# Patient Record
Sex: MALE | Race: ASIAN | URBAN - METROPOLITAN AREA
[De-identification: names, ages, dates, MRNs, and addresses within clinical notes are randomized per-mention and may not be internally consistent; named-entity substitution may affect disease eponyms.]

---

## 2018-02-26 ENCOUNTER — IMPORTED ENCOUNTER (OUTPATIENT)
Dept: URBAN - METROPOLITAN AREA CLINIC 1 | Facility: CLINIC | Age: 58
End: 2018-02-26

## 2018-02-26 PROBLEM — H43.812: Noted: 2018-02-26

## 2018-02-26 PROBLEM — E11.9: Noted: 2018-02-26

## 2018-02-26 PROBLEM — H10.45: Noted: 2018-02-26

## 2018-02-26 PROBLEM — H25.13: Noted: 2018-02-26

## 2018-02-26 PROBLEM — Z79.84: Noted: 2018-02-26

## 2018-02-26 PROCEDURE — 92004 COMPRE OPH EXAM NEW PT 1/>: CPT

## 2018-02-26 PROCEDURE — 92015 DETERMINE REFRACTIVE STATE: CPT

## 2018-02-26 NOTE — PATIENT DISCUSSION
1.  DM Type II (Oral Medication) without sign of diabetic retinopathy and no blot heme on dilated retinal examination today OU No Macular Edema:  Discussed the pathophysiology of diabetes and its effect on the eye and risk of blindness. Stressed the importance of strong glucose control. Advised of importance of at least yearly dilated examinations but to contact us immediately for any problems or concerns. 2. Allergic Conjunctivitis OU :  Condition discussed with patient. Advised patient to use OTC Zaditor one drop OU BID prn.3. Cataract OU: Observe for now without intervention. The patient was advised to contact us if any change or worsening of vision4. PVD w/o Tear OS- RD precautions. MRX for glasses givenReturn for an appointment in 1 year 27 with Dr. Mark Mcgowan.

## 2018-09-17 NOTE — PATIENT DISCUSSION
New Prescription: ketorolac (ketorolac): drops: 0.4% 1 drop four times a day as needed for pain into affected eye 09-

## 2018-09-17 NOTE — PATIENT DISCUSSION
Continue: erythromycin (erythromycin): ointment: 5 mg/gram (0.5 %) a small amount four times a day as directed into affected eye 09-

## 2018-09-19 NOTE — PATIENT DISCUSSION
THIS PLAN REFLECTS WHAT I TOLD PATIENT ON 09/11/2018. WAS UNABLE TO ENTER DATA INTO CHART ON DATE OF EXAM DUE TO COMPUTER PROBLEMS. PATIENT WAS UNABLE TO KEEP THE FRIDAY APPOINTMENT DUE TO HURRICANE TREMAINE AND THE OFFICE BEING CLOSED. PATIENT DID NOT CALL THE ON-CALL LINE. PATIENT WAS ABLE TO SEE DR. Román Webster MD [address] [facility] (09/17/2018). PATIENT HAD ALREADY SCHEDULED AN APPT WITH DR. KENDRICK EVEN BEFORE SCHEDULING AN APPOINTMENT WITH ME AS HE HAS A HISTORY OF A RETINAL DETACHMENT.

## 2018-09-19 NOTE — PATIENT DISCUSSION
CORNEAL ABRASION OD, INITIAL ENCOUNTER: ECN MANNIE QID OD. Will see patient back on Friday 09/14/2018. ARTIFICIAL TEARS QID OU.

## 2018-09-19 NOTE — PATIENT DISCUSSION
Continue: ketorolac (ketorolac): drops: 0.4% 1 drop four times a day as needed for pain into affected eye 09-

## 2019-03-07 ENCOUNTER — IMPORTED ENCOUNTER (OUTPATIENT)
Dept: URBAN - METROPOLITAN AREA CLINIC 1 | Facility: CLINIC | Age: 59
End: 2019-03-07

## 2019-03-07 PROBLEM — H10.45: Noted: 2019-03-07

## 2019-03-07 PROBLEM — H25.813: Noted: 2019-03-07

## 2019-03-07 PROBLEM — R73.09: Noted: 2019-03-07

## 2019-03-07 PROBLEM — H43.812: Noted: 2019-03-07

## 2019-03-07 PROCEDURE — 92014 COMPRE OPH EXAM EST PT 1/>: CPT

## 2019-03-07 NOTE — PATIENT DISCUSSION
1.  DM Type II (Diet Controlled) without sign of diabetic retinopathy and no blot heme on dilated retinal examination today OU No Macular Edema:  Discussed the pathophysiology of diabetes and its effect on the eye and risk of blindness. Stressed the importance of strong glucose control. Advised of importance of at least yearly dilated examinations but to contact us immediately for any problems or concerns. 2. Cataract OU: Observe for now without intervention. The patient was advised to contact us if any change or worsening of vision3. Allergic Conjunctivitis OU :  Condition discussed with patient. Advised patient to use OTC Zaditor one drop OU BID prn.4. PVD w/o Tear OS- RD precautions. Patient defers refraction at today's visitReturn for an appointment in 1 year 27 with Dr. Sonia Miranda.

## 2019-04-03 ENCOUNTER — IMPORTED ENCOUNTER (OUTPATIENT)
Dept: URBAN - METROPOLITAN AREA CLINIC 1 | Facility: CLINIC | Age: 59
End: 2019-04-03

## 2019-04-03 PROCEDURE — 92015 DETERMINE REFRACTIVE STATE: CPT

## 2020-03-04 ENCOUNTER — IMPORTED ENCOUNTER (OUTPATIENT)
Dept: URBAN - METROPOLITAN AREA CLINIC 1 | Facility: CLINIC | Age: 60
End: 2020-03-04

## 2020-03-04 PROBLEM — H16.143: Noted: 2020-03-04

## 2020-03-04 PROBLEM — H04.123: Noted: 2020-03-04

## 2020-03-04 PROBLEM — H25.813: Noted: 2020-03-04

## 2020-03-04 PROBLEM — R73.09: Noted: 2020-03-04

## 2020-03-04 PROBLEM — H43.812: Noted: 2020-03-04

## 2020-03-04 PROBLEM — H10.45: Noted: 2020-03-04

## 2020-03-04 PROCEDURE — 92014 COMPRE OPH EXAM EST PT 1/>: CPT

## 2020-03-04 PROCEDURE — 92015 DETERMINE REFRACTIVE STATE: CPT

## 2020-03-04 NOTE — PATIENT DISCUSSION
1.  DM Type II (Diet Controlled) without sign of diabetic retinopathy and no blot heme on dilated retinal examination today OU No Macular Edema. Discussed the pathophysiology of diabetes and its effect on the eye and risk of blindness. Stressed the importance of strong glucose control. Advised of importance of at least yearly dilated examinations but to contact us immediately for any problems or concerns. 2. Cataract OU -- Observe for now without intervention. The patient was advised to contact us if any change or worsening of vision3. Allergic Conjunctivitis OU --  Condition discussed with patient. Advised patient to use OTC Zaditor one drop OU BID prn.4. PVD w/o Tear OS -- RD precautions. Finalized Glasses MRx today. Return for an appointment in 1 year 27 with Dr. Andi Watkins.

## 2021-03-03 ENCOUNTER — PREPPED CHART (OUTPATIENT)
Dept: URBAN - METROPOLITAN AREA CLINIC 1 | Facility: CLINIC | Age: 61
End: 2021-03-03

## 2021-03-03 ENCOUNTER — IMPORTED ENCOUNTER (OUTPATIENT)
Dept: URBAN - METROPOLITAN AREA CLINIC 1 | Facility: CLINIC | Age: 61
End: 2021-03-03

## 2021-03-03 PROBLEM — R73.09: Noted: 2021-03-03

## 2021-03-03 PROBLEM — H10.45: Noted: 2021-03-03

## 2021-03-03 PROBLEM — H43.813: Noted: 2021-03-03

## 2021-03-03 PROBLEM — H25.813: Noted: 2021-03-03

## 2021-03-03 PROCEDURE — 92014 COMPRE OPH EXAM EST PT 1/>: CPT

## 2021-03-03 PROCEDURE — 92015 DETERMINE REFRACTIVE STATE: CPT

## 2021-03-03 NOTE — PATIENT DISCUSSION
1.  DM Type II (Diet Controlled) without sign of diabetic retinopathy and no blot heme on dilated retinal examination today OU No Macular Edema -- Discussed the pathophysiology of diabetes and its effect on the eye and risk of blindness. Stressed the importance of strong glucose control. Advised of importance of at least yearly dilated examinations but to contact us immediately for any problems or concerns. 2. Cataract OU -- progression OU. Non-surgical at this time continue to monitor for progression. The patient was advised to contact us if any change or worsening of vision. 3. Allergic Conjunctivitis OU -- Cont the recommended use of Zaditor BID OU PRN itching. 4. PVD w/o Tear OU -- *New OD. Old stable OS. RD Precautions. MRx for glasses given to patient. Letter to PCP. Return for an appointment in 1 year 27 with Dr. Hannah De Leon.

## 2021-03-03 NOTE — PATIENT DISCUSSION
Cataract OU -- progression OU. Non-surgical at this time continue to monitor for progression. The patient was advised to contact us if any change or worsening of vision.

## 2022-02-28 ASSESSMENT — VISUAL ACUITY
OD_CC: J1
OS_CC: 20/25
OS_CC: J1
OD_CC: 20/25-2

## 2022-02-28 ASSESSMENT — TONOMETRY
OD_IOP_MMHG: 15
OS_IOP_MMHG: 15

## 2022-03-02 ENCOUNTER — COMPREHENSIVE EXAM (OUTPATIENT)
Dept: URBAN - METROPOLITAN AREA CLINIC 1 | Facility: CLINIC | Age: 62
End: 2022-03-02

## 2022-03-02 DIAGNOSIS — H10.45: ICD-10-CM

## 2022-03-02 DIAGNOSIS — H43.813: ICD-10-CM

## 2022-03-02 DIAGNOSIS — E11.9: ICD-10-CM

## 2022-03-02 DIAGNOSIS — H25.813: ICD-10-CM

## 2022-03-02 PROCEDURE — 99214 OFFICE O/P EST MOD 30 MIN: CPT

## 2022-03-02 ASSESSMENT — VISUAL ACUITY
OS_CC: 20/20
OS_CC: J1+
OD_CC: J1+
OD_CC: 20/20

## 2022-03-02 ASSESSMENT — TONOMETRY
OS_IOP_MMHG: 12
OD_IOP_MMHG: 12

## 2022-03-02 NOTE — PATIENT DISCUSSION
Recommend OTC Pataday QD OU, PRN itching. Condition discussed with patient. Avoidance of allergens recommended.

## 2022-04-02 ASSESSMENT — VISUAL ACUITY
OS_SC: 20/25
OD_CC: J2
OS_SC: 20/20-1
OS_CC: J1
OD_CC: J1
OD_SC: 20/20
OS_SC: 20/20
OS_CC: J1
OD_SC: 20/25-2
OS_SC: 20/30+1
OD_CC: J1
OS_SC: 20/20
OD_SC: 20/20
OS_CC: J1
OD_SC: 20/20
OD_CC: J1
OD_SC: 20/30+1
OS_CC: J1

## 2022-04-02 ASSESSMENT — KERATOMETRY
OS_AXISANGLE_DEGREES: 105
OS_K1POWER_DIOPTERS: 40.50
OD_K2POWER_DIOPTERS: 40.25
OS_K2POWER_DIOPTERS: 41.00
OD_AXISANGLE_DEGREES: 085
OS_AXISANGLE2_DEGREES: 015
OD_K1POWER_DIOPTERS: 40.00
OD_AXISANGLE2_DEGREES: 175

## 2022-04-02 ASSESSMENT — TONOMETRY
OS_IOP_MMHG: 14
OD_IOP_MMHG: 14
OD_IOP_MMHG: 13
OD_IOP_MMHG: 15
OS_IOP_MMHG: 15
OS_IOP_MMHG: 15
OS_IOP_MMHG: 13
OD_IOP_MMHG: 15

## 2022-09-07 ENCOUNTER — ESTABLISHED PATIENT (OUTPATIENT)
Dept: URBAN - METROPOLITAN AREA CLINIC 1 | Facility: CLINIC | Age: 62
End: 2022-09-07

## 2022-09-07 DIAGNOSIS — H52.4: ICD-10-CM

## 2022-09-07 DIAGNOSIS — H52.223: ICD-10-CM

## 2022-09-07 DIAGNOSIS — H52.13: ICD-10-CM

## 2022-09-07 DIAGNOSIS — Z01.00: ICD-10-CM

## 2022-09-07 PROCEDURE — 92014 COMPRE OPH EXAM EST PT 1/>: CPT

## 2022-09-07 PROCEDURE — 92015 DETERMINE REFRACTIVE STATE: CPT

## 2022-09-07 ASSESSMENT — VISUAL ACUITY
OS_CC: 20/25
OD_CC: 20/25
OD_CC: J1
OS_CC: J1

## 2022-09-07 ASSESSMENT — KERATOMETRY
OD_K2POWER_DIOPTERS: 40.25
OD_AXISANGLE2_DEGREES: 13
OS_AXISANGLE_DEGREES: 113
OS_AXISANGLE2_DEGREES: 23
OD_AXISANGLE_DEGREES: 103
OS_K1POWER_DIOPTERS: 40.50
OD_K1POWER_DIOPTERS: 40.00
OS_K2POWER_DIOPTERS: 41.00

## 2022-09-07 ASSESSMENT — TONOMETRY
OS_IOP_MMHG: 13
OD_IOP_MMHG: 13

## 2023-09-11 ENCOUNTER — COMPREHENSIVE EXAM (OUTPATIENT)
Dept: URBAN - METROPOLITAN AREA CLINIC 1 | Facility: CLINIC | Age: 63
End: 2023-09-11

## 2023-09-11 DIAGNOSIS — H52.13: ICD-10-CM

## 2023-09-11 DIAGNOSIS — H52.4: ICD-10-CM

## 2023-09-11 DIAGNOSIS — H52.223: ICD-10-CM

## 2023-09-11 PROCEDURE — 92015 DETERMINE REFRACTIVE STATE: CPT

## 2023-09-11 PROCEDURE — 92014 COMPRE OPH EXAM EST PT 1/>: CPT

## 2023-09-11 ASSESSMENT — TONOMETRY
OD_IOP_MMHG: 11
OS_IOP_MMHG: 11

## 2023-09-11 ASSESSMENT — KERATOMETRY
OS_AXISANGLE_DEGREES: 113
OS_K1POWER_DIOPTERS: 40.50
OD_K1POWER_DIOPTERS: 40.00
OD_AXISANGLE2_DEGREES: 13
OS_K2POWER_DIOPTERS: 41.00
OS_AXISANGLE2_DEGREES: 23
OD_K2POWER_DIOPTERS: 40.25
OD_AXISANGLE_DEGREES: 103

## 2023-09-11 ASSESSMENT — VISUAL ACUITY
OD_CC: 20/20
OS_CC: 20/20-1
OU_CC: J1+

## 2024-03-14 ENCOUNTER — COMPREHENSIVE EXAM (OUTPATIENT)
Dept: URBAN - METROPOLITAN AREA CLINIC 1 | Facility: CLINIC | Age: 64
End: 2024-03-14

## 2024-03-14 DIAGNOSIS — H43.813: ICD-10-CM

## 2024-03-14 DIAGNOSIS — H10.45: ICD-10-CM

## 2024-03-14 DIAGNOSIS — E11.9: ICD-10-CM

## 2024-03-14 DIAGNOSIS — H25.813: ICD-10-CM

## 2024-03-14 PROCEDURE — 92014 COMPRE OPH EXAM EST PT 1/>: CPT

## 2024-03-14 ASSESSMENT — VISUAL ACUITY
OD_CC: J1+
OS_CC: J1+
OD_BAT: 20/30
OS_CC: 20/20
OD_CC: 20/20
OS_BAT: 20/30

## 2024-03-14 ASSESSMENT — TONOMETRY
OS_IOP_MMHG: 12
OD_IOP_MMHG: 12

## 2024-11-07 ENCOUNTER — COMPREHENSIVE EXAM (OUTPATIENT)
Dept: URBAN - METROPOLITAN AREA CLINIC 1 | Facility: CLINIC | Age: 64
End: 2024-11-07

## 2024-11-07 DIAGNOSIS — Z01.00: ICD-10-CM

## 2024-11-07 DIAGNOSIS — H52.223: ICD-10-CM

## 2024-11-07 DIAGNOSIS — H52.13: ICD-10-CM

## 2024-11-07 DIAGNOSIS — H52.4: ICD-10-CM

## 2024-11-07 PROCEDURE — 92014 COMPRE OPH EXAM EST PT 1/>: CPT

## 2024-11-07 PROCEDURE — 92015 DETERMINE REFRACTIVE STATE: CPT

## 2025-04-07 ENCOUNTER — COMPREHENSIVE EXAM (OUTPATIENT)
Age: 65
End: 2025-04-07

## 2025-04-07 DIAGNOSIS — H25.813: ICD-10-CM

## 2025-04-07 DIAGNOSIS — H10.45: ICD-10-CM

## 2025-04-07 DIAGNOSIS — H43.813: ICD-10-CM

## 2025-04-07 DIAGNOSIS — E11.9: ICD-10-CM

## 2025-04-07 PROCEDURE — 99214 OFFICE O/P EST MOD 30 MIN: CPT
